# Patient Record
(demographics unavailable — no encounter records)

---

## 2024-10-28 NOTE — ASSESSMENT
[FreeTextEntry1] : Impression) metastatic pancreas cancer  Plan) reviewed operative findings and final pathology with the patient and her .  Has metastatic disease in the form of peritoneal implants in the lesser sac.  Surgery at this point not indicated and is in need of systemic chemotherapy.  Will seek opinions from local oncologist here in [place] (Dr. Aranda) as well as someone closer to home in [place] and make a decision as to where they would like to be treated.  Will follow-up with us after 4 to 6 months of chemotherapy and see where she is at at that time.  All questions answered.  Andrea Otero MD  Chief Surgical Oncology Multidisciplinary GI cancer program [facility]  Professor Surgery [employer]   CC Dr. Connors

## 2024-10-28 NOTE — HISTORY OF PRESENT ILLNESS
[de-identified] :   Patient Name: YUMIKO MARTINEZ MRN: [id number] Date: 10/28/2024  Diagnosis: Metastatic pancreas cancer Operative Date: 10/16/2024  79 yo female presents for a post op follow up for metastatic pancreas CA. She is 12 days s/p Robotic laparoscopy with peritoneal biopsy.  At surgery multiple peritoneal lesions were identified in the lesser sac and resection was aborted.  5/23/24 - EUS with Dr. De Oliveira showed PDD of the main pancreatic duct in the body and tail measuring up to 5mm, with no discrete mass, no gallstones. Biopsies showed scant epithelial cells, one pass showed atypical glandular epithelium, and one pass showed acellular proteinaceous material suggestive of cyst contents. She was hospitalized after the EUS for about a week d/t epigastric pain.  5/25/24 - CT AP showed a fluid collection in the gastric wall, segmental dilation of the PD measuring up to 4.7 mm, and a 0.8 cm pancreas body cyst.  6/31/24 - follow up CT showed decrease in gastric wall fluid collection and peripancreatic inflammatory changes. Also noted was mild pancreatic duct dilatation in the body and tail, with persistent abrupt cutoff at the level of the proximal pancreatic body, suspicious for an underlying pancreatic mass.  7/1/24 - MRI/MRCP showed stable diffuse dilatation of the duct within the body and tail of the pancreas, suggestion of a 7 mm lesion.  7/9/24 - CA 19-9 is high at 255 and IGG4 is within normal range.  7/18/24 - PET CT revealed known pancreas malignancy, gastric body lesion, and no other sites of metastatic diseases.  7/23/24 - She was seen in follow up and it was decided that the patient will proceed with the TAVR procedure first then follow up with us to discuss a distal pancreatectomy.  8/15/24 - TAVR done. She is now on Plavix daily.  9/12/24 - CT AP revealed stable size of known pancreatic neck mass, no abdominal or pelvic metastasis.  10/16/2024 - Patient went for a robotic Laparoscopy with peritoneal biopsy. There were number of lesions identified on the peritoneum in the lesser sac including on the mesentery of the colon.  Biopsy showed metastatic adenocarcinoma consistent with the pancreas primary.   She feels well today. She had some abdominal distention which has reportedly improved. She is moving her bowels regularly and is tolerating a regular appetite.

## 2024-10-28 NOTE — PHYSICAL EXAM
[Normal] : well developed, well nourished, in no acute distress [de-identified] : soft, non tender, surgical sites are healing well without any signs of infection